# Patient Record
Sex: FEMALE | Race: WHITE | Employment: OTHER | URBAN - METROPOLITAN AREA
[De-identification: names, ages, dates, MRNs, and addresses within clinical notes are randomized per-mention and may not be internally consistent; named-entity substitution may affect disease eponyms.]

---

## 2021-01-07 ENCOUNTER — TRANSCRIBE ORDER (OUTPATIENT)
Dept: SCHEDULING | Age: 86
End: 2021-01-07

## 2021-01-07 DIAGNOSIS — E21.3 HYPERPARATHYROIDISM, UNSPECIFIED (HCC): ICD-10-CM

## 2021-01-07 DIAGNOSIS — E04.1 NONTOXIC UNINODULAR GOITER: Primary | ICD-10-CM

## 2021-01-07 DIAGNOSIS — E83.52 HYPERCALCEMIA: ICD-10-CM

## 2021-01-13 RX ORDER — ACETAMINOPHEN 500 MG
2000 TABLET ORAL DAILY
COMMUNITY

## 2021-01-13 RX ORDER — ROSUVASTATIN CALCIUM 5 MG/1
5 TABLET, COATED ORAL
COMMUNITY

## 2021-01-13 RX ORDER — OMEPRAZOLE 20 MG/1
20 CAPSULE, DELAYED RELEASE ORAL DAILY
COMMUNITY

## 2021-01-13 RX ORDER — METOPROLOL SUCCINATE 25 MG/1
25 TABLET, EXTENDED RELEASE ORAL DAILY
COMMUNITY

## 2021-01-13 NOTE — PROGRESS NOTES
PT aware of arrival time pre procedure. Will arrive at 1330 for 1400 procedure. Pt states no questions at this time.

## 2021-01-19 ENCOUNTER — HOSPITAL ENCOUNTER (OUTPATIENT)
Dept: ULTRASOUND IMAGING | Age: 86
Discharge: HOME | End: 2021-01-19
Attending: OTOLARYNGOLOGY
Payer: MEDICARE

## 2021-01-19 DIAGNOSIS — E21.3 HYPERPARATHYROIDISM, UNSPECIFIED (HCC): ICD-10-CM

## 2021-01-19 DIAGNOSIS — E83.52 HYPERCALCEMIA: ICD-10-CM

## 2021-01-19 DIAGNOSIS — E04.1 NONTOXIC UNINODULAR GOITER: ICD-10-CM

## 2021-01-19 PROCEDURE — 88172 CYTP DX EVAL FNA 1ST EA SITE: CPT

## 2021-01-19 PROCEDURE — 74011000250 HC RX REV CODE- 250: Performed by: OTOLARYNGOLOGY

## 2021-01-19 PROCEDURE — 10005 FNA BX W/US GDN 1ST LES: CPT

## 2021-01-19 PROCEDURE — 88173 CYTOPATH EVAL FNA REPORT: CPT

## 2021-01-19 PROCEDURE — 77030041504 US GUIDE BX THYROID PERC NDL

## 2021-01-19 PROCEDURE — 88177 CYTP FNA EVAL EA ADDL: CPT

## 2021-01-19 PROCEDURE — 88305 TISSUE EXAM BY PATHOLOGIST: CPT

## 2021-01-19 RX ORDER — LIDOCAINE HYDROCHLORIDE 10 MG/ML
10 INJECTION, SOLUTION EPIDURAL; INFILTRATION; INTRACAUDAL; PERINEURAL
Status: COMPLETED | OUTPATIENT
Start: 2021-01-19 | End: 2021-01-19

## 2021-01-19 RX ADMIN — LIDOCAINE HYDROCHLORIDE ANHYDROUS 7 ML: 10 INJECTION, SOLUTION INFILTRATION at 15:18

## 2021-01-19 NOTE — ROUTINE PROCESS
Ultrasound guided FNA of Rt thyroid nodule complete. 3 passes performed, specimen received by pathologist.   Patient tolerated procedure well. Pt.education given, ice pack applied to neck and given to pt. Pt. Left department in stable condition.
No

## 2021-01-19 NOTE — PROCEDURES
Neuroradiology Brief Procedure Note    Interventional Radiologist: Rusty Ceja MD    Pre-operative Diagnosis:  Right thyroid nodule    Post-operative Diagnosis: Same as pre-operative diagnosis    Procedure(s) Performed:  US guided thyroid Fine Needle Aspiration    Anesthesia:  Local     Findings:  Informed consent. US guided thyroid FNA performed. See final dictated report for full details.     Complications: No immediate    Estimated Blood Loss:  Minimal    Specimens: 3 passes    Rusty Ceja MD  [facility]  1/19/2021